# Patient Record
Sex: FEMALE | Race: WHITE | NOT HISPANIC OR LATINO | Employment: UNEMPLOYED | URBAN - METROPOLITAN AREA
[De-identification: names, ages, dates, MRNs, and addresses within clinical notes are randomized per-mention and may not be internally consistent; named-entity substitution may affect disease eponyms.]

---

## 2017-01-09 ENCOUNTER — TELEPHONE (OUTPATIENT)
Dept: FAMILY MEDICINE | Facility: CLINIC | Age: 52
End: 2017-01-09

## 2017-01-09 ENCOUNTER — OFFICE VISIT (OUTPATIENT)
Dept: FAMILY MEDICINE | Facility: CLINIC | Age: 52
End: 2017-01-09

## 2017-01-09 VITALS
WEIGHT: 150.56 LBS | TEMPERATURE: 98 F | DIASTOLIC BLOOD PRESSURE: 78 MMHG | HEART RATE: 79 BPM | SYSTOLIC BLOOD PRESSURE: 120 MMHG | BODY MASS INDEX: 25.7 KG/M2 | RESPIRATION RATE: 16 BRPM | HEIGHT: 64 IN | OXYGEN SATURATION: 98 %

## 2017-01-09 DIAGNOSIS — F41.9 ANXIETY AND DEPRESSION: Primary | ICD-10-CM

## 2017-01-09 DIAGNOSIS — F32.A ANXIETY AND DEPRESSION: Primary | ICD-10-CM

## 2017-01-09 PROCEDURE — 99203 OFFICE O/P NEW LOW 30 MIN: CPT | Mod: S$GLB,,, | Performed by: NURSE PRACTITIONER

## 2017-01-09 RX ORDER — SERTRALINE HYDROCHLORIDE 100 MG/1
100 TABLET, FILM COATED ORAL DAILY
COMMUNITY
End: 2017-01-09

## 2017-01-09 RX ORDER — BUSPIRONE HYDROCHLORIDE 5 MG/1
5 TABLET ORAL 3 TIMES DAILY
Qty: 90 TABLET | Refills: 0 | Status: SHIPPED | OUTPATIENT
Start: 2017-01-09 | End: 2017-02-09 | Stop reason: SDUPTHER

## 2017-01-09 RX ORDER — SERTRALINE HYDROCHLORIDE 50 MG/1
50 TABLET, FILM COATED ORAL DAILY
Qty: 30 TABLET | Refills: 0 | Status: SHIPPED | OUTPATIENT
Start: 2017-01-09 | End: 2017-02-09 | Stop reason: SDUPTHER

## 2017-01-09 NOTE — MR AVS SNAPSHOT
[facility]  [address] [address] [address]  Phone: [phone]  Fax: [phone]                  Christine Tabor   2017 10:00 AM   Office Visit    Description:  Female : 1965   Provider:  JACK Vale   Department:  [facility]           Reason for Visit     Establish Care     Depression     Anxiety           Diagnoses this Visit        Comments    Anxiety and depression    -  Primary            To Do List           Future Appointments        Provider Department Dept Phone    2017 9:00 AM JACK Vale [facility] [phone]      Goals (5 Years of Data)     None      Follow-Up and Disposition     Return in about 1 month (around 2017).    Follow-up and Disposition History       These Medications        Disp Refills Start End    sertraline (ZOLOFT) 50 MG tablet 30 tablet 0 2017    Take 1 tablet (50 mg total) by mouth once daily. - Oral    Pharmacy: [facility] [address] [place] [address] [email or web address] [facility] Ph #: [phone]       busPIRone (BUSPAR) 5 MG Tab 90 tablet 0 2017    Take 1 tablet (5 mg total) by mouth 3 (three) times daily. - Oral    Pharmacy: [place] [id number] [email or web address] [facility] Ph #: [phone]         [facility] On Call     [facility] On Call Nurse [facility] -  Assistance  Registered nurses in the [facility] provide clinical advisement, health education, appointment booking, and other advisory services.  Call for this free service at [phone].             Medications           Message regarding Medications     Verify the changes and/or additions to your medication regime listed below are the same as discussed with your clinician today.  If any of these changes or additions are incorrect, please notify your healthcare provider.        START taking these NEW medications        Refills    sertraline (ZOLOFT) 50 MG tablet  "0    Sig: Take 1 tablet (50 mg total) by mouth once daily.    Class: Normal    Route: Oral    busPIRone (BUSPAR) 5 MG Tab 0    Sig: Take 1 tablet (5 mg total) by mouth 3 (three) times daily.    Class: Normal    Route: Oral      STOP taking these medications     LORAZEPAM ORAL Take by mouth daily as needed.           Verify that the below list of medications is an accurate representation of the medications you are currently taking.  If none reported, the list may be blank. If incorrect, please contact your healthcare provider. Carry this list with you in case of emergency.           Current Medications     busPIRone (BUSPAR) 5 MG Tab Take 1 tablet (5 mg total) by mouth 3 (three) times daily.    sertraline (ZOLOFT) 50 MG tablet Take 1 tablet (50 mg total) by mouth once daily.           Clinical Reference Information           Vital Signs - Last Recorded  Most recent update: 1/9/2017 10:10 AM by Oanh Cronin MA    BP Pulse Temp Resp Ht Wt    120/78 (BP Location: Right arm, Patient Position: Sitting, BP Method: Automatic) 79 98.4 °F (36.9 °C) (Oral) 16 5' 4" (1.626 m) 68.3 kg (150 lb 9.2 oz)    SpO2 BMI             98% 25.85 kg/m2         Blood Pressure          Most Recent Value    BP  120/78      Allergies as of 1/9/2017     No Known Allergies      Immunizations Administered on Date of Encounter - 1/9/2017     None      [facility] Sign-Up     Activating your [facility] account is as easy as 1-2-3!     1) Visit [email or web address], select Sign Up Now, enter this activation code and your date of birth, then select Next.  [email or web address]  Expires: 2/23/2017 11:08 AM      2) Create a username and password to use when you visit [facility] in the future and select a security question in case you lose your password and select Next.    3) Enter your e-mail address and click Sign Up!    Additional Information  If you have questions, please e-mail [email or web address] or call [phone] to talk to our [facility] staff. " Remember, [facility] is NOT to be used for urgent needs. For medical emergencies, dial 911.         Instructions     Reminded patient that depression is a chemical imbalance, no different than diabetes or hypothyroidism. Assessed for suicidal ideation which the patient denies and explained black box warning on all anti-depressants and instructed  to stop medication and call me immediately if any suicidal ideation should develop.

## 2017-01-09 NOTE — TELEPHONE ENCOUNTER
Pt states that the Buspar she started today isn't helping her anxiety.  States that she feels worse and she is shaking.  Explained to pt that she should give the medication a good week for her body to adjust and feel the full effect of medication.  Also explained that if her symptoms continue to worsen she should contact office. Verbalized understanding.

## 2017-01-09 NOTE — TELEPHONE ENCOUNTER
----- Message from Kaela Moses sent at 1/9/2017  1:51 PM CST -----  Patient was seen earlier today and given new medication for shaking and anxiety/stated medication is not helping/please call patient back at [phone] to advise.

## 2017-01-09 NOTE — PROGRESS NOTES
Subjective:       Patient ID: Christine Tabor is a 51 y.o. female.    Chief Complaint: Establish Care; Depression ( passed away); and Anxiety    Mental Health Problem   The primary symptoms include dysphoric mood. The current episode started more than 1 month ago. This is a recurrent problem.   The onset of the illness is precipitated by a stressful event (  3 months ago. ). The degree of incapacity that she is experiencing as a consequence of her illness is severe (felt like she couldn't get out of bed. ). Sequelae of the illness include an inability to work, an inability to care for self and homelessness (Started drinking ETOH, became suicidal, but was unable to get care due to shortages in the area she was living in. ). Additional symptoms of the illness include insomnia. She does not admit to suicidal ideas. She does not have a plan to commit suicide. She does not contemplate harming herself. She has not already injured self. She does not contemplate injuring another person. She has not already  injured another person. Risk factors that are present for mental illness include a history of mental illness (currently taking zoloft that she had, feeling better on it, but very shakey and not sleeping well. ).     Review of Systems   Psychiatric/Behavioral: Positive for dysphoric mood. The patient has insomnia.        Objective:      Physical Exam   Constitutional: She is oriented to person, place, and time. She appears well-developed and well-nourished. No distress.   HENT:   Head: Normocephalic and atraumatic.   Eyes: Conjunctivae are normal. Right eye exhibits no discharge. Left eye exhibits no discharge. No scleral icterus.   Cardiovascular: Normal rate, regular rhythm and normal heart sounds.  Exam reveals no gallop and no friction rub.    No murmur heard.  Pulmonary/Chest: Effort normal and breath sounds normal. No respiratory distress. She has no wheezes. She has no rales.   Neurological: She  is alert and oriented to person, place, and time.   Skin: Skin is warm and dry. She is not diaphoretic.   Psychiatric: She has a normal mood and affect. Her behavior is normal.   Nursing note and vitals reviewed.      Assessment:       1. Anxiety and depression        Plan:       Anxiety and depression  -     sertraline (ZOLOFT) 50 MG tablet; Take 1 tablet (50 mg total) by mouth once daily.  Dispense: 30 tablet; Refill: 0  -     busPIRone (BUSPAR) 5 MG Tab; Take 1 tablet (5 mg total) by mouth 3 (three) times daily.  Dispense: 90 tablet; Refill: 0      1 month   Reminded patient that depression is a chemical imbalance, no different than diabetes or hypothyroidism. Assessed for suicidal ideation which the patient denies and explained black box warning on all anti-depressants and instructed  to stop medication and call me immediately if any suicidal ideation should develop.

## 2017-01-09 NOTE — PATIENT INSTRUCTIONS
Reminded patient that depression is a chemical imbalance, no different than diabetes or hypothyroidism. Assessed for suicidal ideation which the patient denies and explained black box warning on all anti-depressants and instructed  to stop medication and call me immediately if any suicidal ideation should develop.

## 2017-02-09 DIAGNOSIS — F41.9 ANXIETY AND DEPRESSION: ICD-10-CM

## 2017-02-09 DIAGNOSIS — F32.A ANXIETY AND DEPRESSION: ICD-10-CM

## 2017-02-09 RX ORDER — SERTRALINE HYDROCHLORIDE 50 MG/1
50 TABLET, FILM COATED ORAL DAILY
Qty: 30 TABLET | Refills: 0 | Status: SHIPPED | OUTPATIENT
Start: 2017-02-09 | End: 2018-02-09

## 2017-02-09 RX ORDER — BUSPIRONE HYDROCHLORIDE 5 MG/1
5 TABLET ORAL 3 TIMES DAILY
Qty: 90 TABLET | Refills: 0 | Status: SHIPPED | OUTPATIENT
Start: 2017-02-09 | End: 2018-02-09

## 2017-02-09 NOTE — TELEPHONE ENCOUNTER
----- Message from Vanda Wade sent at 2/9/2017  9:18 AM CST -----  Contact: self   Patient wants to speak with a nurse regarding medication refill please call back at [phone] (home)

## 2017-02-09 NOTE — TELEPHONE ENCOUNTER
Patient is requesting a refill on both buspar and zoloft for another month.She is out of town and couldn't make her follow up.She has appointment on 03/03/2017 and says she is feeling great and doesn't want to stop medication and go back to feeling depressed.Please send refill to [address].

## 2017-02-10 NOTE — TELEPHONE ENCOUNTER
Spoke with patient notified I did talk with [facility] where she is and they should have her medication already filled for her verbal understanding noted

## 2017-02-10 NOTE — TELEPHONE ENCOUNTER
----- Message from Tammie Rosales sent at 2/9/2017  6:03 PM CST -----  Contact: Blaze/Addie [phone]  Mr. Real called and would like to speak to doctor in reference to medication. No other information was given.    Mr. Real can be reached at [phone]    Thank you

## 2017-02-10 NOTE — TELEPHONE ENCOUNTER
----- Message from Sandra Lara sent at 2/10/2017 10:46 AM CST -----  Contact: patient  Patient calling in regards to following up on a request for a refill on Zoloft and Buspirone. Please advise.  Call back .  Thanks!  [facility] [address] [place] [address] - [facility] [address]  [address]  [facility] [address]  Phone: [phone] Fax: [phone]